# Patient Record
(demographics unavailable — no encounter records)

---

## 2025-05-14 NOTE — ASSESSMENT
[FreeTextEntry1] : Hypothyroidism - Recheck TFTs today - Continue [place] thyroid 75mg/day for now - Will message Dr. Diehl for Levothyroxine dosage conversion   HLD - Feb 2025: Total chol 259, , HDL 93 - Recheck lipids - Continue low fat diet and exercise  Pelvic mass/pain - Continue follow up with GYN and pelvic sonogram   Osteoporosis - Dexa Dec 2024: T score -2.6 - Patient not interested in osteoporosis medication. Continue weight bearing exercises   Follow up in 3 months

## 2025-05-14 NOTE — PHYSICAL EXAM
[Normal] : normal rate, regular rhythm, normal S1 and S2 and no murmur heard [Coordination Grossly Intact] : coordination grossly intact [No Focal Deficits] : no focal deficits [Normal Gait] : normal gait [Normal Affect] : the affect was normal [Alert and Oriented x3] : oriented to person, place, and time [Normal Insight/Judgement] : insight and judgment were intact [de-identified] : small hard mass palpated over right pelvic area.

## 2025-05-14 NOTE — HISTORY OF PRESENT ILLNESS
[FreeTextEntry1] : pt presents for med follow up  [de-identified] : IZAEBLLA DOAN is a 60 year female who presents today May 14, 2025 for follow up.  She remains on [place] Thyroid 75mg/day. After much thought, she is now considering switching to levothyroxine. She made an endocrinology appt with Dr. Patel in Nov 2025.  She has hard mass in pelvic area causing pain. She is seeing [facility] and is scheduled for upcoming pelvic sonogram She is about to start a liver cleanse. She is fasting today for blood work.   Thyroid US March 2025 0.6 x 0.3 x 0.5 cm TR 4 nodule in right thyroid gland.